# Patient Record
Sex: FEMALE | Race: WHITE | NOT HISPANIC OR LATINO | Employment: OTHER | URBAN - METROPOLITAN AREA
[De-identification: names, ages, dates, MRNs, and addresses within clinical notes are randomized per-mention and may not be internally consistent; named-entity substitution may affect disease eponyms.]

---

## 2017-04-25 RX ORDER — CLONAZEPAM 0.5 MG/1
0.5 TABLET ORAL 2 TIMES DAILY PRN
COMMUNITY

## 2017-04-25 RX ORDER — HYDROCHLOROTHIAZIDE 25 MG/1
25 TABLET ORAL DAILY
COMMUNITY
End: 2017-04-26 | Stop reason: SDDI

## 2017-04-25 RX ORDER — GABAPENTIN 600 MG/1
600 TABLET ORAL 3 TIMES DAILY
COMMUNITY

## 2017-04-25 RX ORDER — POTASSIUM CHLORIDE 750 MG/1
10 CAPSULE, EXTENDED RELEASE ORAL 2 TIMES DAILY
COMMUNITY

## 2017-04-25 RX ORDER — DULOXETIN HYDROCHLORIDE 60 MG/1
60 CAPSULE, DELAYED RELEASE ORAL DAILY
COMMUNITY

## 2017-04-25 RX ORDER — ALBUTEROL SULFATE 90 UG/1
2 AEROSOL, METERED RESPIRATORY (INHALATION) EVERY 4 HOURS PRN
COMMUNITY

## 2017-04-25 RX ORDER — SENNOSIDES 8.6 MG
650 CAPSULE ORAL EVERY 8 HOURS PRN
COMMUNITY

## 2017-04-25 RX ORDER — ASPIRIN 81 MG/1
81 TABLET ORAL DAILY
COMMUNITY

## 2017-04-25 RX ORDER — LORATADINE 10 MG/1
CAPSULE, LIQUID FILLED ORAL
COMMUNITY

## 2017-04-25 RX ORDER — MULTIVITAMIN,THER AND MINERALS
TABLET ORAL
COMMUNITY

## 2017-04-26 ENCOUNTER — OFFICE VISIT (OUTPATIENT)
Dept: NEUROSURGERY | Facility: CLINIC | Age: 47
End: 2017-04-26

## 2017-04-26 VITALS — BODY MASS INDEX: 29.88 KG/M2 | TEMPERATURE: 96.9 F | HEIGHT: 64 IN | WEIGHT: 175 LBS

## 2017-04-26 DIAGNOSIS — G56.22 ULNAR NEUROPATHY AT ELBOW OF LEFT UPPER EXTREMITY: ICD-10-CM

## 2017-04-26 DIAGNOSIS — M50.30 DEGENERATIVE DISC DISEASE, CERVICAL: ICD-10-CM

## 2017-04-26 DIAGNOSIS — M50.30 BULGING OF CERVICAL INTERVERTEBRAL DISC: Primary | ICD-10-CM

## 2017-04-26 PROCEDURE — 99213 OFFICE O/P EST LOW 20 MIN: CPT | Performed by: NEUROLOGICAL SURGERY

## 2017-04-26 RX ORDER — LINACLOTIDE 145 UG/1
CAPSULE, GELATIN COATED ORAL
Refills: 0 | COMMUNITY
Start: 2017-03-17

## 2017-04-26 RX ORDER — ATENOLOL 25 MG/1
TABLET ORAL
Refills: 0 | COMMUNITY
Start: 2017-03-20

## 2017-04-26 RX ORDER — TORSEMIDE 20 MG/1
TABLET ORAL
Refills: 0 | COMMUNITY
Start: 2017-03-15

## 2017-04-26 NOTE — PROGRESS NOTES
Patient: Tiffany Brizuela  : 1970    Primary Care Provider: Will Ricci DO    Requesting Provider: As above      History    Chief Complaint: Left arm pain with diminished  as well as numbness.    History of Present Illness: Ms. Brizuela is well-known to my service.  On 12 she underwent left T5-6 transpedicular foraminotomy.  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  At that time she was harboring a significant right upper extremity pain.  She now describes a 1 month history of severe pain in her left elbow.  She has some sensory alteration that extends into the ulnar aspects of the forearm and into the ulnar fingers of the left hand.  She is however also having neck pain and pain in her upper arm.  She has no right-sided symptoms.  Nothing makes her feel better.  It'll movement of the head or use of the left upper extremity will aggravate her symptoms.  She does report some diminished  on the left.  She denies bowel or bladder dysfunction.    Review of Systems   Constitutional: Positive for activity change, diaphoresis and fatigue. Negative for appetite change, chills, fever and unexpected weight change.   HENT: Positive for congestion, mouth sores, postnasal drip, sinus pressure, sore throat, trouble swallowing and voice change. Negative for dental problem, drooling, ear discharge, ear pain, facial swelling, hearing loss, nosebleeds, rhinorrhea, sneezing and tinnitus.    Eyes: Negative for photophobia, pain, discharge, redness, itching and visual disturbance.   Respiratory: Positive for cough. Negative for apnea, choking, chest tightness, shortness of breath, wheezing and stridor.    Cardiovascular: Positive for palpitations and leg swelling. Negative for chest pain.   Gastrointestinal: Positive for abdominal distention and constipation. Negative for abdominal pain, anal bleeding, blood in stool,  "diarrhea, nausea, rectal pain and vomiting.   Endocrine: Positive for cold intolerance, heat intolerance and polydipsia.   Musculoskeletal: Positive for arthralgias, back pain, gait problem and neck pain. Negative for joint swelling, myalgias and neck stiffness.   Skin: Negative for color change, pallor, rash and wound.   Allergic/Immunologic: Positive for environmental allergies. Negative for food allergies and immunocompromised state.   Neurological: Positive for dizziness, tremors, weakness, light-headedness, numbness and headaches. Negative for seizures, syncope, facial asymmetry and speech difficulty.   Hematological: Negative for adenopathy. Bruises/bleeds easily.   Psychiatric/Behavioral: Positive for dysphoric mood. Negative for agitation, behavioral problems, confusion, decreased concentration, self-injury, sleep disturbance and suicidal ideas. The patient is nervous/anxious. The patient is not hyperactive.        The patient's past medical history, past surgical history, family history, and social history have been reviewed at length in the electronic medical record.    Physical Exam:   Temp 96.9 °F (36.1 °C)  Ht 64\" (162.6 cm)  Wt 175 lb (79.4 kg)  BMI 30.04 kg/m2  MUSCULOSKELETAL:  Neck tenderness to palpation is not observed.   ROM in neck is normal.  NEUROLOGICAL:  Strength is intact in the upper and lower extremities to direct testing.  Muscle tone is normal throughout.  Station and gait are normal.  Sensation is intact to light touch testing throughout.  Deep tendon reflexes are 2+ on the left and slightly more brisk on the right, particularly in the leg.  Ethan signs are negative.  No clonus is elicited at the ankles.        Medical Decision Making    Data Review:   MRI study of the neck dated 4/17/17 demonstrates some mild disc bulging or spurring bilaterally at C6-7.  This is modest particular he on the left.  I don't see any significant disease at C7-T1 although the axial images are " somewhat degraded.    Diagnosis:   Cervical spondylosis with mechanical neck pain.  I'm not convinced that the patient has a radiculopathy.  We could be dealing with an ulnar neuropathy as well.    Treatment Options:   I have referred the patient to physical therapy that will include traction.  She'll follow-up in my clinic in several weeks.  Prior to that visit I would like to check electrodiagnostic studies of her left upper extremity.       Diagnosis Plan   1. Bulging of cervical intervertebral disc  EMG & Nerve Conduction Test    Ambulatory Referral to Physical Therapy Evaluate and treat   2. Degenerative disc disease, cervical     3. Ulnar neuropathy at elbow of left upper extremity         Scribed for Marvin Corona MD by Kayleen Gil CMA on 04/26/2017 at 2:48 PM    I, Dr. Corona, personally performed the services described in the documentation, as scribed in my presence, and it is both accurate and complete.

## 2017-05-08 ENCOUNTER — HOSPITAL ENCOUNTER (OUTPATIENT)
Dept: PHYSICAL THERAPY | Facility: HOSPITAL | Age: 47
Setting detail: THERAPIES SERIES
Discharge: HOME | End: 2017-05-08

## 2017-05-08 DIAGNOSIS — M79.602 PAIN OF LEFT UPPER EXTREMITY: ICD-10-CM

## 2017-05-08 DIAGNOSIS — M50.30 BULGING OF CERVICAL INTERVERTEBRAL DISC: Primary | ICD-10-CM

## 2017-05-08 DIAGNOSIS — M54.2 NECK PAIN: ICD-10-CM

## 2017-05-08 PROCEDURE — G8984 CARRY CURRENT STATUS: HCPCS

## 2017-05-08 PROCEDURE — G8985 CARRY GOAL STATUS: HCPCS

## 2017-05-08 PROCEDURE — 97162 PT EVAL MOD COMPLEX 30 MIN: CPT

## 2017-05-17 ENCOUNTER — HOSPITAL ENCOUNTER (OUTPATIENT)
Dept: PHYSICAL THERAPY | Facility: HOSPITAL | Age: 47
Setting detail: THERAPIES SERIES
Discharge: HOME | End: 2017-05-17

## 2017-05-17 DIAGNOSIS — M50.30 BULGING OF CERVICAL INTERVERTEBRAL DISC: ICD-10-CM

## 2017-05-17 DIAGNOSIS — M54.2 NECK PAIN: Primary | ICD-10-CM

## 2017-05-17 DIAGNOSIS — M79.602 PAIN OF LEFT UPPER EXTREMITY: ICD-10-CM

## 2017-05-17 PROCEDURE — 97110 THERAPEUTIC EXERCISES: CPT

## 2017-05-17 PROCEDURE — 97035 APP MDLTY 1+ULTRASOUND EA 15: CPT

## 2017-05-17 PROCEDURE — 97140 MANUAL THERAPY 1/> REGIONS: CPT

## 2017-05-23 ENCOUNTER — HOSPITAL ENCOUNTER (OUTPATIENT)
Dept: NEUROLOGY | Facility: HOSPITAL | Age: 47
Discharge: HOME | End: 2017-05-23
Attending: NEUROLOGICAL SURGERY | Admitting: NEUROLOGICAL SURGERY

## 2017-05-23 DIAGNOSIS — G56.22 ULNAR NEUROPATHY AT ELBOW OF LEFT UPPER EXTREMITY: ICD-10-CM

## 2017-05-23 PROCEDURE — 95886 MUSC TEST DONE W/N TEST COMP: CPT

## 2017-05-23 PROCEDURE — 95908 NRV CNDJ TST 3-4 STUDIES: CPT

## 2017-05-24 ENCOUNTER — DOCUMENTATION (OUTPATIENT)
Dept: NEUROSURGERY | Facility: CLINIC | Age: 47
End: 2017-05-24

## 2017-06-21 ENCOUNTER — OFFICE VISIT (OUTPATIENT)
Dept: NEUROSURGERY | Facility: CLINIC | Age: 47
End: 2017-06-21

## 2017-06-21 VITALS — HEIGHT: 64 IN | TEMPERATURE: 96.5 F | BODY MASS INDEX: 30.9 KG/M2 | WEIGHT: 181 LBS

## 2017-06-21 DIAGNOSIS — M50.30 DEGENERATIVE DISC DISEASE, CERVICAL: Primary | ICD-10-CM

## 2017-06-21 DIAGNOSIS — M50.30 BULGING OF CERVICAL INTERVERTEBRAL DISC: ICD-10-CM

## 2017-06-21 DIAGNOSIS — G56.22 ULNAR NEUROPATHY AT ELBOW OF LEFT UPPER EXTREMITY: ICD-10-CM

## 2017-06-21 DIAGNOSIS — M77.8 LEFT ELBOW TENDONITIS: ICD-10-CM

## 2017-06-21 PROCEDURE — 99213 OFFICE O/P EST LOW 20 MIN: CPT | Performed by: NEUROLOGICAL SURGERY

## 2017-07-05 ENCOUNTER — DOCUMENTATION (OUTPATIENT)
Dept: PHYSICAL THERAPY | Facility: HOSPITAL | Age: 47
End: 2017-07-05

## 2017-07-05 DIAGNOSIS — M50.30 BULGING OF CERVICAL INTERVERTEBRAL DISC: ICD-10-CM

## 2017-07-05 DIAGNOSIS — M79.602 PAIN OF LEFT UPPER EXTREMITY: ICD-10-CM

## 2017-07-05 DIAGNOSIS — M54.2 NECK PAIN: Primary | ICD-10-CM

## 2017-07-05 NOTE — THERAPY DISCHARGE NOTE
Outpatient Physical Therapy Discharge Summary         Patient Name: Tiffany Brizuela  : 1970  MRN: [id number]    Today's Date: 2017    Visit Dx:    ICD-10-CM ICD-9-CM   1. Neck pain M54.2 723.1   2. Pain of left upper extremity M79.602 729.5   3. Bulging of cervical intervertebral disc M50.20 722.0             PT OP Goals       17 1000       PT Short Term Goals    STG Date to Achieve 17  -LF     STG 1 Patient subjectively reports that altered symptoms have decreased by 50% with frequency or intensity.  -LF     STG 1 Progress Not Met  -LF     STG 2 Patient able to sleep undisturbed by neck/UE symptoms  -LF     STG 2 Progress Not Met  -LF     Long Term Goals    LTG Date to Achieve 17  -LF     LTG 1 NDI score is improved by at least 10%.  -LF     LTG 1 Progress Not Met  -LF     LTG 2 Patient's left   strength will be improved to within 15lbs of the right hand.  -LF     LTG 2 Progress Not Met  -LF     LTG 3 Patient will return to previous functional status for ADLs/ vocational/recreational/sports activities as identified by patient.  -LF     LTG 3 Progress Not Met  -LF     LTG 4 Patient is independent with long term HEP for improved postural awareness and stabilization.  -LF     LTG 4 Progress Not Met  -LF       User Key  (r) = Recorded By, (t) = Taken By, (c) = Cosigned By    Initials Name Provider Type    LF Zuleika Asencio, PT Physical Therapist          OP PT Discharge Summary  Date of Discharge: 17  Reason for Discharge: Non-compliant  Outcomes Achieved: Other (goals not met due to limited treatment)  Discharge Instructions: Tiffany was evaluated on May 8, 2017 and seen for 1 follow-up visit.  She had cancelled x2, then did not show for her last 2 scheduled appointments.  She is  discharged due to lapse in treatment              Zuleika Asencio, PT  2017